# Patient Record
Sex: FEMALE | Race: WHITE | Employment: OTHER | URBAN - METROPOLITAN AREA
[De-identification: names, ages, dates, MRNs, and addresses within clinical notes are randomized per-mention and may not be internally consistent; named-entity substitution may affect disease eponyms.]

---

## 2019-01-25 ENCOUNTER — ANESTHESIA EVENT (OUTPATIENT)
Dept: ENDOSCOPY | Age: 64
End: 2019-01-25
Payer: MEDICARE

## 2019-01-28 ENCOUNTER — HOSPITAL ENCOUNTER (OUTPATIENT)
Age: 64
Setting detail: OUTPATIENT SURGERY
Discharge: HOME | End: 2019-01-28
Attending: INTERNAL MEDICINE | Admitting: INTERNAL MEDICINE
Payer: MEDICARE

## 2019-01-28 ENCOUNTER — ANESTHESIA (OUTPATIENT)
Dept: ENDOSCOPY | Age: 64
End: 2019-01-28
Payer: MEDICARE

## 2019-01-28 VITALS
HEIGHT: 62 IN | DIASTOLIC BLOOD PRESSURE: 59 MMHG | TEMPERATURE: 97.8 F | HEART RATE: 59 BPM | WEIGHT: 137.44 LBS | SYSTOLIC BLOOD PRESSURE: 112 MMHG | OXYGEN SATURATION: 100 % | BODY MASS INDEX: 25.29 KG/M2 | RESPIRATION RATE: 16 BRPM

## 2019-01-28 LAB
BUN BLD-MCNC: 5 MG/DL (ref 7–18)
CHLORIDE BLD-SCNC: 97 MMOL/L (ref 100–108)
GLUCOSE BLD STRIP.AUTO-MCNC: 93 MG/DL (ref 74–106)
HCT VFR BLD CALC: 45 % (ref 36–49)
HGB BLD-MCNC: 15.3 G/DL (ref 12–16)
POTASSIUM BLD-SCNC: 3.9 MMOL/L (ref 3.5–5.5)
SODIUM BLD-SCNC: 141 MMOL/L (ref 136–145)

## 2019-01-28 PROCEDURE — 77030031670 HC DEV INFL ENDOTEK BIG60 MRTM -B: Performed by: INTERNAL MEDICINE

## 2019-01-28 PROCEDURE — 74011250637 HC RX REV CODE- 250/637

## 2019-01-28 PROCEDURE — 74011000250 HC RX REV CODE- 250: Performed by: ANESTHESIOLOGY

## 2019-01-28 PROCEDURE — 76060000032 HC ANESTHESIA 0.5 TO 1 HR: Performed by: INTERNAL MEDICINE

## 2019-01-28 PROCEDURE — 76040000007: Performed by: INTERNAL MEDICINE

## 2019-01-28 PROCEDURE — 88305 TISSUE EXAM BY PATHOLOGIST: CPT

## 2019-01-28 PROCEDURE — 82947 ASSAY GLUCOSE BLOOD QUANT: CPT

## 2019-01-28 PROCEDURE — 74011250636 HC RX REV CODE- 250/636

## 2019-01-28 PROCEDURE — 74011250636 HC RX REV CODE- 250/636: Performed by: NURSE ANESTHETIST, CERTIFIED REGISTERED

## 2019-01-28 PROCEDURE — 77030038604 HC SNR ENDO EXACTO USEN -B: Performed by: INTERNAL MEDICINE

## 2019-01-28 RX ORDER — ALBUTEROL SULFATE 90 UG/1
AEROSOL, METERED RESPIRATORY (INHALATION) AS NEEDED
Status: DISCONTINUED | OUTPATIENT
Start: 2019-01-28 | End: 2019-01-28 | Stop reason: HOSPADM

## 2019-01-28 RX ORDER — PROPOFOL 10 MG/ML
INJECTION, EMULSION INTRAVENOUS AS NEEDED
Status: DISCONTINUED | OUTPATIENT
Start: 2019-01-28 | End: 2019-01-28 | Stop reason: HOSPADM

## 2019-01-28 RX ORDER — SODIUM CHLORIDE, SODIUM LACTATE, POTASSIUM CHLORIDE, CALCIUM CHLORIDE 600; 310; 30; 20 MG/100ML; MG/100ML; MG/100ML; MG/100ML
100 INJECTION, SOLUTION INTRAVENOUS CONTINUOUS
Status: DISCONTINUED | OUTPATIENT
Start: 2019-01-28 | End: 2019-01-28 | Stop reason: HOSPADM

## 2019-01-28 RX ORDER — LIDOCAINE HYDROCHLORIDE 20 MG/ML
INJECTION, SOLUTION EPIDURAL; INFILTRATION; INTRACAUDAL; PERINEURAL AS NEEDED
Status: DISCONTINUED | OUTPATIENT
Start: 2019-01-28 | End: 2019-01-28 | Stop reason: HOSPADM

## 2019-01-28 RX ORDER — IPRATROPIUM BROMIDE AND ALBUTEROL SULFATE 2.5; .5 MG/3ML; MG/3ML
3 SOLUTION RESPIRATORY (INHALATION)
Status: COMPLETED | OUTPATIENT
Start: 2019-01-28 | End: 2019-01-28

## 2019-01-28 RX ADMIN — SODIUM CHLORIDE, SODIUM LACTATE, POTASSIUM CHLORIDE, AND CALCIUM CHLORIDE 100 ML/HR: 600; 310; 30; 20 INJECTION, SOLUTION INTRAVENOUS at 09:34

## 2019-01-28 RX ADMIN — PROPOFOL 30 MG: 10 INJECTION, EMULSION INTRAVENOUS at 11:39

## 2019-01-28 RX ADMIN — LIDOCAINE HYDROCHLORIDE 60 MG: 20 INJECTION, SOLUTION EPIDURAL; INFILTRATION; INTRACAUDAL; PERINEURAL at 11:38

## 2019-01-28 RX ADMIN — PROPOFOL 50 MG: 10 INJECTION, EMULSION INTRAVENOUS at 11:56

## 2019-01-28 RX ADMIN — PROPOFOL 50 MG: 10 INJECTION, EMULSION INTRAVENOUS at 11:46

## 2019-01-28 RX ADMIN — PROPOFOL 50 MG: 10 INJECTION, EMULSION INTRAVENOUS at 11:51

## 2019-01-28 RX ADMIN — PROPOFOL 20 MG: 10 INJECTION, EMULSION INTRAVENOUS at 11:43

## 2019-01-28 RX ADMIN — PROPOFOL 50 MG: 10 INJECTION, EMULSION INTRAVENOUS at 11:41

## 2019-01-28 RX ADMIN — PROPOFOL 50 MG: 10 INJECTION, EMULSION INTRAVENOUS at 12:00

## 2019-01-28 RX ADMIN — ALBUTEROL SULFATE 3 PUFF: 90 AEROSOL, METERED RESPIRATORY (INHALATION) at 11:37

## 2019-01-28 RX ADMIN — PROPOFOL 70 MG: 10 INJECTION, EMULSION INTRAVENOUS at 11:38

## 2019-01-28 RX ADMIN — IPRATROPIUM BROMIDE AND ALBUTEROL SULFATE 3 ML: .5; 3 SOLUTION RESPIRATORY (INHALATION) at 10:18

## 2019-01-28 NOTE — ANESTHESIA POSTPROCEDURE EVALUATION
Procedure(s): 
COLONOSCOPY 
ESOPHAGOGASTRODUODENOSCOPY (EGD). Anesthesia Post Evaluation Multimodal analgesia: multimodal analgesia used between 6 hours prior to anesthesia start to PACU discharge Patient location during evaluation: bedside Patient participation: complete - patient participated Level of consciousness: awake Pain management: adequate Airway patency: patent Anesthetic complications: no 
Cardiovascular status: stable Respiratory status: acceptable Hydration status: acceptable Post anesthesia nausea and vomiting:  controlled Visit Vitals /59 (BP 1 Location: Left arm, BP Patient Position: At rest) Pulse (!) 59 Temp 36.6 °C (97.8 °F) Resp 16 Ht 5' 2\" (1.575 m) Wt 62.3 kg (137 lb 7 oz) SpO2 100% Breastfeeding? No  
BMI 25.14 kg/m²

## 2019-01-28 NOTE — H&P
History and Physical 
 
Regis Holiday 1955 
[id number] 
[id number] Pre-Procedure Diagnosis:  alcoholic cirrhosis of liver with ascited k70.31, encounter for screening colonoscope z12.11 Evaluation of past illnesses, surgeries, or injuries:   YES History reviewed. No pertinent past medical history. No past surgical history on file. Allergies: Allergies Allergen Reactions  Morphine Nausea and Vomiting Previous reactions to sedation/analgesia? NO Review of current medications, supplement, herbals and nutraceuticals complete:  YES Current Facility-Administered Medications Medication Dose Route Frequency Provider Last Rate Last Dose  lactated Ringers infusion  100 mL/hr IntraVENous CONTINUOUS Chestine Don, CRNA 100 mL/hr at 01/28/19 0934 100 mL/hr at 01/28/19 4227 Pertinent labs reviewed? YES History of substance abuse? NO No family history on file. Social History Socioeconomic History  Marital status:  Spouse name: Not on file  Number of children: Not on file  Years of education: Not on file  Highest education level: Not on file Social Needs  Financial resource strain: Not on file  Food insecurity - worry: Not on file  Food insecurity - inability: Not on file  Transportation needs - medical: Not on file  Transportation needs - non-medical: Not on file Occupational History  Not on file Tobacco Use  Smoking status: Not on file Substance and Sexual Activity  Alcohol use: Not on file  Drug use: Not on file  Sexual activity: Not on file Other Topics Concern  Not on file Social History Narrative  Not on file Cardiac Status:  WNL Mental Status:  WNL Pulmonary Status:  WNL 
 
NPO:  5-8 Assessment/Impression: Screening for esophageal varices. Screening colonoscopy Plan of treatment: EGD and Colonoscopy Sydnee Godinez MD 
1/28/2019 
11:33 AM

## 2019-01-28 NOTE — PERIOP NOTES
Phase 2 Recovery Summary Patient arrived to Phase 2 at  Report received from [facility] Vitals:  
 01/28/19 0931 01/28/19 1214 BP: 111/71 112/59 Pulse: 68 (!) 59 Resp: 16 16 Temp: 97.8 °F (36.6 °C) SpO2: 94% 100% Weight: 62.3 kg (137 lb 7 oz) Height: 5' 2\" (1.575 m)   
 
 
oriented to time, place, person and situation Lines and Drains Peripheral Intravenous Line:  
Peripheral IV 01/28/19 Right Arm (Active) Site Assessment Clean, dry, & intact 1/28/2019 12:16 PM  
Phlebitis Assessment 0 1/28/2019 12:16 PM  
Infiltration Assessment 0 1/28/2019 12:16 PM  
Dressing Status Clean, dry, & intact 1/28/2019 12:16 PM  
Dressing Type Tape;Transparent 1/28/2019 12:16 PM  
Hub Color/Line Status Pink; Infusing 1/28/2019 12:16 PM  
Alcohol Cap Used Yes 1/28/2019  9:33 AM  
 
 
Patient arrived to phase 2 from [facility]. Alert and oriented x4. No complaints of pain, nausea or dizziness. Vital signs taken. Dr. Michael Sales spoke with patient. Patient given ginger ale and crackers. Patient ambulated to bathroom with assistance. Voided without complications. Upon return, IV removed and patient allowed to get dressed. Reviewed discharge instructions.  signed for discharge. Patient transported to vehicle via wheelchair. Patient discharged to home with Luna Meyer, Spouse  at [address] [place] [address]

## 2019-01-28 NOTE — PROCEDURES
Endoscopy Procedure Note    Patient: Ambrosio Ya MRN: [id number]  SSN: [id number]    YOB: 1955  Age: 70 y.o. Sex: female      Date/Time:  1/28/2019 12:11 PM    Esophagogastroduodenoscopy (EGD) Procedure Note    Procedure: Esophagogastroduodenoscopy    IMPRESSION:   1. Small sliding hiatus hernia. 2. No esophagogastric varices. RECOMMENDATIONS:  1. -Repeat EGD in two years. Indication: Cirrhosis, screening for varices. :  Shelton Rosales MD  Referring Provider:   Lien Smith MD  History: The history and physical exam were reviewed and updated. Endoscope: [id number]  Extent of Exam: second portion of the duodenum  ASA: Per Anesthesia  Anethesia/Sedation:  MAC anesthesia    Description of the procedure: The procedure was discussed with the patient including risks, benefits, alternatives including risks of iv sedation, bleeding, perforation and aspiration. A safety timeout was performed. The patient was placed in the left lateral decubitus position. A bite block was placed. The patient was given incremental doses of intravenous sedation until moderate sedation was achieved. The patients vital signs were monitored at all times including heart rate/rhythm, blood pressure and oxygen saturation. The endoscope was then passed under direct visualization to the second portion of the duodenum. The endoscope was then slowly withdrawn while visualizing the mucosa. In the stomach a retroflexion was performed and gastric fundus and cardia visualized. The endoscope was then slowly withdrawn. The patient was then transferred to recovery in stable condition. Findings:    Esophagus: The esophageal mucosa without ulcers, erosions, inflammatory changes, changes of Serra's esophagus, strictures, or mass lesions. A small, sliding hiatus hernia was seen. Stomach: The gastric mucosa was without ulcers, erosions, vascular, or mass lesions.  The gastric folds were normal. There was no retained food in the stomach. The pylorus was not deformed. Duodenum: The duodenum mucosa was without ulcers, erosions, inflammatory changes, vascular or mass lesions. Specimens:   ID Type Source Tests Collected by Time Destination   1 : (cold snare) polyp  Preservative Colon, Descending  Ortiz Niño MD 1/28/2019 1148 Pathology   2 : (cold snare) polyp  Preservative Colon, Transverse  Ortiz Niño MD 1/28/2019 1204 Pathology               Complications:   None; patient tolerated the procedure well. EBL:None    Discharge disposition:  Home in the company of  when able to ambulate    Mahad Chaudhary MD  January 28, 2019  12:11 PM      Colonoscopy Report    Patient: Cassy Parisi MRN: [id number]  SSN: [id number]    YOB: 1955  Age: 70 y.o. Sex: female      Date of Procedure: 1/28/2019    IMPRESSION:  1. Two small polyps in the descending and transverse colon, cold-snared. 2. Diverticulosis of left colon. 3. Internal hemorrhoids. RECOMMENDATIONS:  1. Await pathology. 2. High fiber diet. Indication:  Screening colonoscopy  Procedure Performed: Colonoscopy polypectomy (cold snare)  Endoscopist: Mahad Chaudhray MD  Assistant: Kathryn Rosa and Abdon Oliver. ASA: Per Anesthesia  Mallampati Score: II (soft palate, uvula, fauces visible)  Anesthesia: MAC anesthesia  Endoscope: [id number]  Extent of Examination:cecum, which was identified by the ileocecal valve and appendiceal orifice, terminal ileum  Quality of Preparation:     []  Excellent   [x]  Very Good   [] Fair but adequate   [] Fair, inadequate   []  Poor      Technique: The procedure was discussed with the patient including risks, benefits, alternatives including risks of IV sedation, bleeding, perforation and missed polyp. A safety timeout was performed. The patient was given incremental doses of Versed and Demerol to achieve moderate conscious sedation.   The patients vital signs were monitored at all times including heart rate, rhythm, blood pressure and oxygen saturation. The patient was placed in left lateral position. When adequate sedation was achieved a perianal inspection and a digital rectal exam were performed. Video colonoscope was introduced into the rectum and advanced under direct vision up to the cecum, which was identified by the ileocecal valve and appendiceal orifice. The cecum was identified by IC valve, appendiceal orifice and crows foot. With adequate insufflation and maneuvering of the withdrawing scope, the colonic mucosa was visualized carefully. Retroflexion was performed in the rectum and the distal rectum visualized. The patient tolerated the procedure very well and was transferred to recovery area. Findings: No ulcers, erosions, inflammatory changes, vascular anomalies, or large mass lesions were seen. A 7 mm sessile polyp was seen in the descending colon and was cold-snared. A 4 mm sessile polyp was seen in the proximal transverse colon and was cold-snared. Diverticula were seen in the left colon. Internal hemorrhoids were noted.    EBL: minimal  Specimen:   ID Type Source Tests Collected by Time Destination   1 : (cold snare) polyp  Preservative Colon, Descending  Trevin Rainey MD 1/28/2019 1148 Pathology   2 : (cold snare) polyp  Preservative Colon, Transverse  Trevin Rainey MD 1/28/2019 1204 Pathology       Naomi Vidal MD  January 28, 2019  12:11 PM

## 2019-01-28 NOTE — ANESTHESIA PREPROCEDURE EVALUATION
Anesthetic History No history of anesthetic complications Review of Systems / Medical History Patient summary reviewed and pertinent labs reviewed Pulmonary COPD Smoker Neuro/Psych Within defined limits Cardiovascular Hypertension Exercise tolerance: >4 METS 
  
GI/Hepatic/Renal 
  
 
 
 
Liver disease Comments: ETOH 
ascitis Endo/Other Within defined limits Other Findings Physical Exam 
 
Airway Mallampati: II 
TM Distance: 4 - 6 cm Neck ROM: normal range of motion Mouth opening: Normal 
 
 Cardiovascular Regular rate and rhythm,  S1 and S2 normal,  no murmur, click, rub, or gallop Rhythm: regular Rate: normal 
 
 
 
 Dental 
 
Dentition: Full upper dentures and Lower partial plate Pulmonary Rhonchi:bilateral 
Decreased breath sounds: bilateral 
Wheezes:bilateral 
 
 
 
 Abdominal 
 
Ascites Other Findings Anesthetic Plan ASA: 3 Anesthesia type: MAC Induction: Intravenous Anesthetic plan and risks discussed with: Patient

## 2020-03-18 ENCOUNTER — ANESTHESIA EVENT (OUTPATIENT)
Dept: SURGERY | Age: 65
End: 2020-03-18
Payer: MEDICARE

## 2020-03-18 ENCOUNTER — ANESTHESIA (OUTPATIENT)
Dept: SURGERY | Age: 65
End: 2020-03-18
Payer: MEDICARE

## 2020-03-18 ENCOUNTER — HOSPITAL ENCOUNTER (OUTPATIENT)
Age: 65
Setting detail: OUTPATIENT SURGERY
Discharge: HOME | End: 2020-03-18
Attending: OPHTHALMOLOGY | Admitting: OPHTHALMOLOGY
Payer: MEDICARE

## 2020-03-18 VITALS
SYSTOLIC BLOOD PRESSURE: 102 MMHG | RESPIRATION RATE: 16 BRPM | BODY MASS INDEX: 34.37 KG/M2 | HEIGHT: 60 IN | DIASTOLIC BLOOD PRESSURE: 60 MMHG | HEART RATE: 65 BPM | WEIGHT: 175.06 LBS | OXYGEN SATURATION: 97 % | TEMPERATURE: 98.2 F

## 2020-03-18 PROBLEM — H26.9 CATARACT: Status: ACTIVE | Noted: 2020-03-18

## 2020-03-18 PROCEDURE — 74011250636 HC RX REV CODE- 250/636: Performed by: OPHTHALMOLOGY

## 2020-03-18 PROCEDURE — 77030006685 HC BLD OPHTH ALCN -B: Performed by: OPHTHALMOLOGY

## 2020-03-18 PROCEDURE — 77030020782 HC GWN BAIR PAWS FLX 3M -B: Performed by: OPHTHALMOLOGY

## 2020-03-18 PROCEDURE — 77030031761 HC CYSTOTOM OPHTH IRR SYS BVTC -A: Performed by: OPHTHALMOLOGY

## 2020-03-18 PROCEDURE — 76210000021 HC REC RM PH II 0.5 TO 1 HR: Performed by: OPHTHALMOLOGY

## 2020-03-18 PROCEDURE — V2632 POST CHMBR INTRAOCULAR LENS: HCPCS | Performed by: OPHTHALMOLOGY

## 2020-03-18 PROCEDURE — 74011000250 HC RX REV CODE- 250: Performed by: OPHTHALMOLOGY

## 2020-03-18 PROCEDURE — 77030040361 HC SLV COMPR DVT MDII -B: Performed by: OPHTHALMOLOGY

## 2020-03-18 PROCEDURE — 77030013851 HC PK PHACO KT ALCN -B: Performed by: OPHTHALMOLOGY

## 2020-03-18 PROCEDURE — 76060000031 HC ANESTHESIA FIRST 0.5 HR: Performed by: OPHTHALMOLOGY

## 2020-03-18 PROCEDURE — 77030013079 HC BLNKT BAIR HGGR 3M -A: Performed by: ANESTHESIOLOGY

## 2020-03-18 PROCEDURE — 77030013389: Performed by: OPHTHALMOLOGY

## 2020-03-18 PROCEDURE — 74011250636 HC RX REV CODE- 250/636: Performed by: REGISTERED NURSE

## 2020-03-18 PROCEDURE — 77030018837 HC SOL IRR OPTH ALCN -A: Performed by: OPHTHALMOLOGY

## 2020-03-18 PROCEDURE — 76010000154 HC OR TIME FIRST 0.5 HR: Performed by: OPHTHALMOLOGY

## 2020-03-18 PROCEDURE — 77030018832 HC SOL IRR H20 ICUM -A: Performed by: OPHTHALMOLOGY

## 2020-03-18 DEVICE — LENS POST SGL PC 6 13 22.0 --: Type: IMPLANTABLE DEVICE | Site: EYE | Status: FUNCTIONAL

## 2020-03-18 RX ORDER — LIDOCAINE HYDROCHLORIDE 10 MG/ML
INJECTION, SOLUTION EPIDURAL; INFILTRATION; INTRACAUDAL; PERINEURAL AS NEEDED
Status: DISCONTINUED | OUTPATIENT
Start: 2020-03-18 | End: 2020-03-18 | Stop reason: HOSPADM

## 2020-03-18 RX ORDER — MAGNESIUM SULFATE 100 %
4 CRYSTALS MISCELLANEOUS AS NEEDED
Status: CANCELLED | OUTPATIENT
Start: 2020-03-18

## 2020-03-18 RX ORDER — EPINEPHRINE 1 MG/ML
INJECTION, SOLUTION, CONCENTRATE INTRAVENOUS AS NEEDED
Status: DISCONTINUED | OUTPATIENT
Start: 2020-03-18 | End: 2020-03-18 | Stop reason: HOSPADM

## 2020-03-18 RX ORDER — PHENYLEPHRINE HYDROCHLORIDE 25 MG/ML
1 SOLUTION/ DROPS OPHTHALMIC
Status: COMPLETED | OUTPATIENT
Start: 2020-03-18 | End: 2020-03-18

## 2020-03-18 RX ORDER — ONDANSETRON 2 MG/ML
INJECTION INTRAMUSCULAR; INTRAVENOUS AS NEEDED
Status: DISCONTINUED | OUTPATIENT
Start: 2020-03-18 | End: 2020-03-18 | Stop reason: HOSPADM

## 2020-03-18 RX ORDER — SODIUM CHLORIDE, SODIUM LACTATE, POTASSIUM CHLORIDE, CALCIUM CHLORIDE 600; 310; 30; 20 MG/100ML; MG/100ML; MG/100ML; MG/100ML
75 INJECTION, SOLUTION INTRAVENOUS CONTINUOUS
Status: DISCONTINUED | OUTPATIENT
Start: 2020-03-18 | End: 2020-03-18 | Stop reason: HOSPADM

## 2020-03-18 RX ORDER — SODIUM CHLORIDE 0.9 % (FLUSH) 0.9 %
5-40 SYRINGE (ML) INJECTION AS NEEDED
Status: CANCELLED | OUTPATIENT
Start: 2020-03-18

## 2020-03-18 RX ORDER — SODIUM CHLORIDE, SODIUM LACTATE, POTASSIUM CHLORIDE, CALCIUM CHLORIDE 600; 310; 30; 20 MG/100ML; MG/100ML; MG/100ML; MG/100ML
1000 INJECTION, SOLUTION INTRAVENOUS CONTINUOUS
Status: CANCELLED | OUTPATIENT
Start: 2020-03-18

## 2020-03-18 RX ORDER — NALOXONE HYDROCHLORIDE 0.4 MG/ML
0.1 INJECTION, SOLUTION INTRAMUSCULAR; INTRAVENOUS; SUBCUTANEOUS AS NEEDED
Status: CANCELLED | OUTPATIENT
Start: 2020-03-18

## 2020-03-18 RX ORDER — MIDAZOLAM HYDROCHLORIDE 1 MG/ML
INJECTION, SOLUTION INTRAMUSCULAR; INTRAVENOUS AS NEEDED
Status: DISCONTINUED | OUTPATIENT
Start: 2020-03-18 | End: 2020-03-18 | Stop reason: HOSPADM

## 2020-03-18 RX ORDER — SODIUM CHLORIDE 0.9 % (FLUSH) 0.9 %
5-40 SYRINGE (ML) INJECTION EVERY 8 HOURS
Status: CANCELLED | OUTPATIENT
Start: 2020-03-18

## 2020-03-18 RX ORDER — FENTANYL CITRATE 50 UG/ML
INJECTION, SOLUTION INTRAMUSCULAR; INTRAVENOUS AS NEEDED
Status: DISCONTINUED | OUTPATIENT
Start: 2020-03-18 | End: 2020-03-18 | Stop reason: HOSPADM

## 2020-03-18 RX ORDER — FLUMAZENIL 0.1 MG/ML
0.2 INJECTION INTRAVENOUS
Status: CANCELLED | OUTPATIENT
Start: 2020-03-18

## 2020-03-18 RX ORDER — TROPICAMIDE 10 MG/ML
1 SOLUTION/ DROPS OPHTHALMIC
Status: COMPLETED | OUTPATIENT
Start: 2020-03-18 | End: 2020-03-18

## 2020-03-18 RX ADMIN — PHENYLEPHRINE HYDROCHLORIDE 1 DROP: 2.5 SOLUTION/ DROPS OPHTHALMIC at 10:06

## 2020-03-18 RX ADMIN — PHENYLEPHRINE HYDROCHLORIDE 1 DROP: 2.5 SOLUTION/ DROPS OPHTHALMIC at 10:24

## 2020-03-18 RX ADMIN — TROPICAMIDE 1 DROP: 10 SOLUTION/ DROPS OPHTHALMIC at 10:01

## 2020-03-18 RX ADMIN — MIDAZOLAM 2 MG: 1 INJECTION INTRAMUSCULAR; INTRAVENOUS at 11:12

## 2020-03-18 RX ADMIN — ONDANSETRON HYDROCHLORIDE 4 MG: 2 INJECTION INTRAMUSCULAR; INTRAVENOUS at 11:14

## 2020-03-18 RX ADMIN — TROPICAMIDE 1 DROP: 10 SOLUTION/ DROPS OPHTHALMIC at 10:11

## 2020-03-18 RX ADMIN — FENTANYL CITRATE 50 MCG: 50 INJECTION, SOLUTION INTRAMUSCULAR; INTRAVENOUS at 11:13

## 2020-03-18 RX ADMIN — TROPICAMIDE 1 DROP: 10 SOLUTION/ DROPS OPHTHALMIC at 10:06

## 2020-03-18 RX ADMIN — FENTANYL CITRATE 25 MCG: 50 INJECTION, SOLUTION INTRAMUSCULAR; INTRAVENOUS at 11:19

## 2020-03-18 RX ADMIN — TROPICAMIDE 1 DROP: 10 SOLUTION/ DROPS OPHTHALMIC at 10:24

## 2020-03-18 RX ADMIN — PHENYLEPHRINE HYDROCHLORIDE 1 DROP: 2.5 SOLUTION/ DROPS OPHTHALMIC at 10:01

## 2020-03-18 RX ADMIN — FENTANYL CITRATE 25 MCG: 50 INJECTION, SOLUTION INTRAMUSCULAR; INTRAVENOUS at 11:21

## 2020-03-18 RX ADMIN — SODIUM CHLORIDE, SODIUM LACTATE, POTASSIUM CHLORIDE, AND CALCIUM CHLORIDE 75 ML/HR: 600; 310; 30; 20 INJECTION, SOLUTION INTRAVENOUS at 10:26

## 2020-03-18 RX ADMIN — PHENYLEPHRINE HYDROCHLORIDE 1 DROP: 2.5 SOLUTION/ DROPS OPHTHALMIC at 10:11

## 2020-03-18 RX ADMIN — LIDOCAINE HYDROCHLORIDE 2 DROP: 35 GEL OPHTHALMIC at 10:26

## 2020-03-18 NOTE — OP NOTES
Cataract Operative Note      Patient: Avel Dao               Sex: female          DOA: 3/18/2020         YOB: 1955      Age:  77 y.o.        LOS:  LOS: 0 days     Preoperative Diagnosis: Cataract left eye    Postoperative Diagnosis:  Cataract  left eye  Surgeon: Chaka Wise MD, M.D. Anesthesia:  Topical anesthesia  Procedure:  Phacoemulsification of posterior chamber for intraocular lens implantation left    Fluids:  0    Procedure in Detail: The operative eye was prepped and draped in the usual fashion. A lid speculum was placed in the operative eye. A clear cornea approach was utilized. A paracentesis incision(s) was constructed with a 1 mm slit knife. One percent preservative-free lidocaine followed by viscoelastic was instilled into the anterior chamber. A clear corneal incision was made with a slit knife. A continuous curvilinear capsulorrhexis was constructed followed by hydrodissection. A phacoemulsification tip was placed into the eye, and the lens nucleus was emulsified. The irrigation/aspiration device was then used to remove any remaining cortical material.  Polishing of the capsule was performed as needed. The intraocular lens was then placed into the capsular bag after it was re-inflated with viscoelastic. The remaining viscoelastic was then removed using the irrigation/aspiration device. BSS on a cannula was then used to hydrate the wound edges. At the end of the procedure the wound was found to be watertight, the anterior chamber was deep and the pupil round. No blood loss during surgery. An antibiotic and anti-inflammatroy was placed into the operative eye. The lid speculum was removed. Protective sunglasses were then placed onto the patient. The patient was taken to the [address] [place] [address] Unit (PACU) in good condition having tolerated the procedure well. Estimated Blood Loss: 0                 Implants:   Implant Name Type Inv.  Item Serial No.  Lot No. LRB No. Used Action   LENS POST SGL PC 6 13 22.0 -- [employer] - [id number] 093 Intraocular Lens LENS POST SGL PC 6 13 22.0 -- [employer] [id number] [employer]  Left 1 Implanted       Specimens: * No specimens in log *            Complications:  None           Denise Hoffman MD, M.D.  [unfilled]  11:35 AM

## 2020-03-18 NOTE — INTERVAL H&P NOTE
H&P Update:  Neeraj Omalley was seen and examined. History and physical has been reviewed. The patient has been examined.  There have been no significant clinical changes since the completion of the originally dated History and Physical.

## 2020-03-18 NOTE — ANESTHESIA POSTPROCEDURE EVALUATION
Post-Anesthesia Evaluation and Assessment    Patient: Huma Estrada MRN: [id number]  SSN: [id number]   YOB: 1955  Age: 77 y.o. Sex: female      Cardiovascular Function/Vital Signs  /51   Pulse 62   Temp 36.6 °C (97.8 °F)   Resp 15   Ht 5' (1.524 m)   Wt 79.4 kg (175 lb 1 oz)   SpO2 100%   BMI 34.19 kg/m²     Patient is status post Procedure(s):  CATARACT EXTRACTION WITH INTRA OCULAR LENS IMPLANT LEFT EYE. Nausea/Vomiting: Controlled. Postoperative hydration reviewed and adequate. Pain:  Pain Scale 1: Numeric (0 - 10) (03/18/20 0948)  Pain Intensity 1: 7 (03/18/20 0948)   Managed. Neurological Status:   Neuro (WDL): Within Defined Limits (03/18/20 1003)   At baseline. Mental Status and Level of Consciousness: Arousable. Pulmonary Status:   O2 Device: Room air (03/18/20 1132)   Adequate oxygenation and airway patent. Complications related to anesthesia: None    Post-anesthesia assessment completed. No concerns. Patient has met all discharge requirements. Signed By: Carla Zuniga CRNA    March 18, 2020             Procedure(s):  CATARACT EXTRACTION WITH INTRA OCULAR LENS IMPLANT LEFT EYE. MAC    <BSHSIANPOST>    No vitals data found for the desired time range.

## 2020-03-18 NOTE — DISCHARGE INSTRUCTIONS
Post-Operative Cataract Instructions  [facility] INVASIVE SURGERY [facility]  Lori Britton M.D. Jeannette Medina. NORM [address], [facility]  [phone]      Diet  1. Resume normal diet. 2. Do not drink alcoholic beverages, including beer for 24 hours. Activity  1. Do not drive a car or operate any hazardous machinery the day of surgery. 2. You may resume normal activities as tolerated. 3. No bending or heavy lifting. 4. No reading or computer work after your surgery. You may watch TV. Wound Care  1. Anticipate that your eye will tear and water. 2. You may also experience a sensation of a foreign body, sand, or grit in the surgical eye, this is normal.  3. Do not touch the affected eye. 4. ** Do not remove eye shield unless directed to do so by your physician. **  5. Wear eye shield when resting or sleeping for one week. Medications  1. Take Tylenol Extra Strength two (2) tablets by mouth upon arrival home and then every four (4) hours as needed for discomfort. 2. Regarding Eye drops:  -  Begin using your eye drops as directed by your physician in the (left) operative eye. One drop of     []   Zymar    [x]  Vigamox   along with one (1) drop     []  Acular    [x]  Voltaren   every four (4) hours while awake. Wait five (5) minutes then apply one (1) drop     []  Pred Forte    [x]  Econopred Plus   every four (4) hours while awake. 3. If you take glaucoma medications, continue to do so unless the physician otherwise instructs you. 4. You may use artificial tears as needed if your eye feels scratchy. Notify your Physician Immediately for any of the followin. Excessive pain not relieved by Tylenol especially headache or increasing pressure to the operative eye. 2. Persistent nausea lasting more than eight hours. 3. If any vomiting occurs. If any questions or concerns arise, call your Surgeon at [phone].       DISCHARGE SUMMARY from Nurse    PATIENT INSTRUCTIONS:    After general anesthesia or intravenous sedation, for 24 hours or while taking prescription Narcotics:  · Limit your activities  · Do not drive and operate hazardous machinery  · Do not make important personal or business decisions  · Do  not drink alcoholic beverages  · If you have not urinated within 8 hours after discharge, please contact your surgeon on call. Report the following to your surgeon:  · Excessive pain, swelling, redness or odor of or around the surgical area  · Temperature over 100.5  · Nausea and vomiting lasting longer than 4 hours or if unable to take medications  · Any signs of decreased circulation or nerve impairment to extremity: change in color, persistent  numbness, tingling, coldness or increase pain  · Any questions    What to do at Home:  Recommended activity: Activity as tolerated and no driving for today,     If you experience any of the following symptoms as above, please follow up with Dr Juanita Rodriguez. *  Please give a list of your current medications to your Primary Care Provider. *  Please update this list whenever your medications are discontinued, doses are      changed, or new medications (including over-the-counter products) are added. *  Please carry medication information at all times in case of emergency situations. These are general instructions for a healthy lifestyle:    No smoking/ No tobacco products/ Avoid exposure to second hand smoke  Surgeon General's Warning:  Quitting smoking now greatly reduces serious risk to your health.     Obesity, smoking, and sedentary lifestyle greatly increases your risk for illness    A healthy diet, regular physical exercise & weight monitoring are important for maintaining a healthy lifestyle    You may be retaining fluid if you have a history of heart failure or if you experience any of the following symptoms:  Weight gain of 3 pounds or more overnight or 5 pounds in a week, increased swelling in our hands or feet or shortness of breath while lying flat in bed. Please call your doctor as soon as you notice any of these symptoms; do not wait until your next office visit. Patient armband removed and shredded    The discharge information has been reviewed with the patient and spouse. The patient and spouse verbalized understanding. Discharge medications reviewed with the patient and spouse and appropriate educational materials and side effects teaching were provided.   ___________________________________________________________________________________________________________________________________

## 2020-03-18 NOTE — ANESTHESIA PREPROCEDURE EVALUATION
Relevant Problems   No relevant active problems       Anesthetic History   No history of anesthetic complications            Review of Systems / Medical History  Patient summary reviewed, nursing notes reviewed and pertinent labs reviewed    Pulmonary  Within defined limits                 Neuro/Psych   Within defined limits           Cardiovascular                  Exercise tolerance: >4 METS     GI/Hepatic/Renal           Liver disease     Endo/Other  Within defined limits           Other Findings   Comments: Raspy voice         Physical Exam    Airway  Mallampati: II  TM Distance: 4 - 6 cm  Neck ROM: normal range of motion   Mouth opening: Normal     Cardiovascular               Dental    Dentition: Full upper dentures  Comments: Lower to be removed   Pulmonary                 Abdominal  GI exam deferred       Other Findings            Anesthetic Plan    ASA: 2  Anesthesia type: MAC          Induction: Intravenous  Anesthetic plan and risks discussed with: Patient

## (undated) DEVICE — BITE BLK ENDOSCP AD 54FR GRN POLYETH ENDOSCP W STRP SLD

## (undated) DEVICE — KIT COLON W/ 1.1OZ LUB AND 2 END

## (undated) DEVICE — Device

## (undated) DEVICE — DEVICE INFL 60ML 12ATM CONVENIENT LOK REL HNDL HI PRSS FLX

## (undated) DEVICE — SYR 50ML SLIP TIP NSAF LF STRL --

## (undated) DEVICE — KENDALL 500 SERIES DIAPHORETIC FOAM MONITORING ELECTRODE - TEAR DROP SHAPE ( 30/PK): Brand: KENDALL

## (undated) DEVICE — STRAP,POSITIONING,KNEE/BODY,FOAM,4X60": Brand: MEDLINE

## (undated) DEVICE — TOWEL SURG W16XL26IN BLU NONFENESTRATED DLX ST 2 PER PK

## (undated) DEVICE — 3M(TM) TEGADERM(TM) TRANSPARENT FILM DRESSING FRAME STYLE 9505W: Brand: 3M™ TEGADERM™

## (undated) DEVICE — SOLUTION IRRIGATION BAL SALT SOLUTION 500 ML STRL BSS

## (undated) DEVICE — CLEARCUT® SLIT KNIFE INTREPID MICRO-COAXIAL SYSTEM 2.4 SB: Brand: CLEARCUT®; INTREPID

## (undated) DEVICE — SYSTEM FLD MGMT DIA0.9MM 45DEG ULT BAL VISN ACT INTREPID

## (undated) DEVICE — CONTAINER PREFIL FRMLN 15ML --

## (undated) DEVICE — GARMENT,MEDLINE,DVT,INT,CALF,MED, GEN2: Brand: MEDLINE

## (undated) DEVICE — TRAP SPEC COLL POLYP POLYSTYR --

## (undated) DEVICE — SOLUTION IRRIGATION H2O 0797305] ICU MEDICAL INC]

## (undated) DEVICE — SNARE ENDO 2.4MMX230CM -- COLD EXACTO

## (undated) DEVICE — CANN VISCOFLOW FRM 27G 22MM --

## (undated) DEVICE — MEDI-VAC NON-CONDUCTIVE SUCTION TUBING: Brand: CARDINAL HEALTH

## (undated) DEVICE — BASIN EMESIS 500CC ROSE 250/CS 60/PLT: Brand: MEDEGEN MEDICAL PRODUCTS, LLC

## (undated) DEVICE — FLEX ADVANTAGE 1500CC: Brand: FLEX ADVANTAGE

## (undated) DEVICE — CYTOTOME IRRIG 25GA L16MM ANT CAP BENT